# Patient Record
Sex: MALE | Race: BLACK OR AFRICAN AMERICAN | NOT HISPANIC OR LATINO | URBAN - METROPOLITAN AREA
[De-identification: names, ages, dates, MRNs, and addresses within clinical notes are randomized per-mention and may not be internally consistent; named-entity substitution may affect disease eponyms.]

---

## 2017-09-06 ENCOUNTER — EMERGENCY (EMERGENCY)
Facility: HOSPITAL | Age: 32
LOS: 1 days | Discharge: HOME | End: 2017-09-06
Admitting: EMERGENCY MEDICINE
Payer: MEDICARE

## 2017-09-06 VITALS
HEIGHT: 77 IN | RESPIRATION RATE: 17 BRPM | DIASTOLIC BLOOD PRESSURE: 80 MMHG | OXYGEN SATURATION: 99 % | SYSTOLIC BLOOD PRESSURE: 120 MMHG | TEMPERATURE: 98 F | WEIGHT: 210.1 LBS | HEART RATE: 97 BPM

## 2017-09-06 VITALS
TEMPERATURE: 98 F | SYSTOLIC BLOOD PRESSURE: 126 MMHG | RESPIRATION RATE: 18 BRPM | DIASTOLIC BLOOD PRESSURE: 78 MMHG | OXYGEN SATURATION: 99 % | HEART RATE: 67 BPM
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
## 2017-09-06 DIAGNOSIS — M54.2 CERVICALGIA: ICD-10-CM
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
## 2017-09-06 DIAGNOSIS — R51 HEADACHE: ICD-10-CM

## 2017-09-06 PROCEDURE — 70450 CT HEAD/BRAIN W/O DYE: CPT | Mod: 26

## 2017-09-06 PROCEDURE — 99283 EMERGENCY DEPT VISIT LOW MDM: CPT | Mod: 25

## 2017-09-06 PROCEDURE — 72125 CT NECK SPINE W/O DYE: CPT | Mod: 26

## 2017-09-06 RX ORDER — KETOROLAC TROMETHAMINE 30 MG/ML
30 SYRINGE (ML) INJECTION ONCE
Qty: 0 | Refills: 0 | Status: DISCONTINUED | OUTPATIENT
Start: 2017-09-06 | End: 2017-09-06

## 2017-09-06 RX ORDER — CLOPIDOGREL BISULFATE 75 MG/1
300 TABLET, FILM COATED ORAL ONCE
Qty: 0 | Refills: 0 | Status: DISCONTINUED | OUTPATIENT
Start: 2017-09-06 | End: 2017-09-06

## 2017-09-06 RX ADMIN — Medication 30 MILLIGRAM(S): at 07:17
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
## 2017-09-06 NOTE — ED PROVIDER NOTE - NS ED ROS FT
Reports headache and neck muscle pain.  Denies fevers, chills, nausea, vomiting, abdominal pain, chest pain, palpitations, shortness of breath, dyspnea on exertion, syncope/near syncope, cough/URI symptoms, weakness, numbness, focal deficits, visual changes, dizziness.
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
## 2017-09-06 NOTE — ED ADULT NURSE REASSESSMENT NOTE - NS ED NURSE REASSESS COMMENT FT1
pt received asleep but arousable to touch/voice. pt awaiting sobriety for discharge. continues on bedside cardiac monitoring.

## 2017-09-06 NOTE — ED ADULT NURSE NOTE - OBJECTIVE STATEMENT
pt. aaox3, here due to neck pain radiating to the back. pt. able to ambulate with steady gait. states pain started at 5 AM.

## 2018-10-01 ENCOUNTER — EMERGENCY (EMERGENCY)
Facility: HOSPITAL | Age: 33
LOS: 1 days | Discharge: HOME | End: 2018-10-01
Attending: EMERGENCY MEDICINE | Admitting: EMERGENCY MEDICINE
Payer: MEDICARE

## 2018-10-01 VITALS
HEART RATE: 88 BPM | OXYGEN SATURATION: 98 % | SYSTOLIC BLOOD PRESSURE: 141 MMHG | DIASTOLIC BLOOD PRESSURE: 87 MMHG | TEMPERATURE: 98 F | RESPIRATION RATE: 18 BRPM

## 2018-10-01 VITALS
RESPIRATION RATE: 18 BRPM | SYSTOLIC BLOOD PRESSURE: 138 MMHG | HEART RATE: 74 BPM | OXYGEN SATURATION: 99 % | DIASTOLIC BLOOD PRESSURE: 85 MMHG | TEMPERATURE: 98 F

## 2018-10-01 DIAGNOSIS — N34.2 OTHER URETHRITIS: ICD-10-CM

## 2018-10-01 DIAGNOSIS — Z20.2 CONTACT WITH AND (SUSPECTED) EXPOSURE TO INFECTIONS WITH A PREDOMINANTLY SEXUAL MODE OF TRANSMISSION: ICD-10-CM

## 2018-10-01 DIAGNOSIS — R30.0 DYSURIA: ICD-10-CM

## 2018-10-01 LAB — HIV 1 & 2 AB SERPL IA.RAPID: SIGNIFICANT CHANGE UP

## 2018-10-01 PROCEDURE — 99284 EMERGENCY DEPT VISIT MOD MDM: CPT

## 2018-10-01 RX ORDER — AZITHROMYCIN 500 MG/1
1000 TABLET, FILM COATED ORAL ONCE
Qty: 0 | Refills: 0 | Status: COMPLETED | OUTPATIENT
Start: 2018-10-01 | End: 2018-10-01

## 2018-10-01 RX ORDER — CEFTRIAXONE 500 MG/1
250 INJECTION, POWDER, FOR SOLUTION INTRAMUSCULAR; INTRAVENOUS ONCE
Qty: 0 | Refills: 0 | Status: COMPLETED | OUTPATIENT
Start: 2018-10-01 | End: 2018-10-01

## 2018-10-01 RX ADMIN — AZITHROMYCIN 1000 MILLIGRAM(S): 500 TABLET, FILM COATED ORAL at 22:42

## 2018-10-01 RX ADMIN — CEFTRIAXONE 250 MILLIGRAM(S): 500 INJECTION, POWDER, FOR SOLUTION INTRAMUSCULAR; INTRAVENOUS at 22:42

## 2018-10-01 NOTE — ED ADULT NURSE NOTE - NSIMPLEMENTINTERV_GEN_ALL_ED
Implemented All Universal Safety Interventions:  [place] to call system. Call bell, personal items and telephone within reach. Instruct patient to call for assistance. Room bathroom lighting operational. Non-slip footwear when patient is off stretcher. Physically safe environment: no spills, clutter or unnecessary equipment. Stretcher in lowest position, wheels locked, appropriate side rails in place.

## 2018-10-01 NOTE — ED PROVIDER NOTE - MEDICAL DECISION MAKING DETAILS
STI testing sent.  Treated with Ceftriaxone and Azithromycin.  Will have him have his partner be re-checked/treated.  Discussed f/u instructions.

## 2018-10-01 NOTE — ED ADULT NURSE NOTE - CHPI ED NUR SYMPTOMS NEG
no tingling/no nausea/no chills/no pain/no vomiting/no decreased eating/drinking/no weakness/no dizziness/no fever

## 2018-10-01 NOTE — ED PROVIDER NOTE - NSFOLLOWUPINSTRUCTIONS_ED_ALL_ED_FT
If any of your tests come back positive we will call you with the results.  If you want to inquire about your results please call [phone].     Please abstain from intercourse for 1 week while the antibiotics work.  Please have any sexual partners tested/treated for STDs.

## 2018-10-01 NOTE — ED ADULT NURSE NOTE - OBJECTIVE STATEMENT
Pt is a 33y male complaining of std/sti check. Pt states that his partner told him in April that she had an std. Pt states that he was never checked. Pt states "I think it burns when I pee, im not sure I have just been thinking about it a lot lately." Pt unsure if he has abnormal discharge states "I don't pay attention to it that much." Pt denies fever, chills, nausea, vomiting. Will continue to monitor.

## 2018-10-01 NOTE — ED PROVIDER NOTE - OBJECTIVE STATEMENT
Pt is a 34yo M with no PMH who presents requesting STI testing.  Pt reports sexually active with female partner that may have been exposed to chlamydia in the recent past.  Pt admits he waited to get f/u but know thinks he has had a very mild dysuria over the past few months.  Denies any discharge, blisters, ulcers, rash, itching, or any other concerns.  Denies any hx of any STI in past, including syphilis.     Contact number: [phone]

## 2018-10-01 NOTE — ED ADULT NURSE NOTE - EXTENSIONS OF SELF_ADULT
[FreeTextEntry1] : weight loss discussed\par follow up 4-6 months for med review\par will follow with gyn, (patient has period today)\par cont lexapro None

## 2018-10-02 LAB
C TRACH RRNA SPEC QL NAA+PROBE: SIGNIFICANT CHANGE UP
N GONORRHOEA RRNA SPEC QL NAA+PROBE: SIGNIFICANT CHANGE UP
SPECIMEN SOURCE: SIGNIFICANT CHANGE UP
T PALLIDUM AB TITR SER: NEGATIVE — SIGNIFICANT CHANGE UP

## 2018-11-20 ENCOUNTER — EMERGENCY (EMERGENCY)
Facility: HOSPITAL | Age: 33
LOS: 1 days | Discharge: HOME | End: 2018-11-20
Admitting: EMERGENCY MEDICINE
Payer: MEDICARE

## 2018-11-20 VITALS
OXYGEN SATURATION: 99 % | HEART RATE: 74 BPM | SYSTOLIC BLOOD PRESSURE: 128 MMHG | DIASTOLIC BLOOD PRESSURE: 80 MMHG | RESPIRATION RATE: 18 BRPM

## 2018-11-20 VITALS
HEART RATE: 72 BPM | SYSTOLIC BLOOD PRESSURE: 146 MMHG | RESPIRATION RATE: 16 BRPM | OXYGEN SATURATION: 99 % | DIASTOLIC BLOOD PRESSURE: 68 MMHG | TEMPERATURE: 98 F

## 2018-11-20 PROCEDURE — 72125 CT NECK SPINE W/O DYE: CPT | Mod: 26

## 2018-11-20 PROCEDURE — 72131 CT LUMBAR SPINE W/O DYE: CPT | Mod: 26

## 2018-11-20 PROCEDURE — 70450 CT HEAD/BRAIN W/O DYE: CPT | Mod: 26

## 2018-11-20 PROCEDURE — 99284 EMERGENCY DEPT VISIT MOD MDM: CPT

## 2018-11-20 RX ORDER — IBUPROFEN 200 MG
1 TABLET ORAL
Qty: 28 | Refills: 0 | OUTPATIENT
Start: 2018-11-20 | End: 2018-11-26

## 2018-11-20 RX ORDER — CYCLOBENZAPRINE HYDROCHLORIDE 10 MG/1
1 TABLET, FILM COATED ORAL
Qty: 10 | Refills: 0 | OUTPATIENT
Start: 2018-11-20 | End: 2018-11-24

## 2018-11-20 RX ORDER — CYCLOBENZAPRINE HYDROCHLORIDE 10 MG/1
10 TABLET, FILM COATED ORAL ONCE
Qty: 0 | Refills: 0 | Status: COMPLETED | OUTPATIENT
Start: 2018-11-20 | End: 2018-11-20

## 2018-11-20 RX ORDER — KETOROLAC TROMETHAMINE 30 MG/ML
60 SYRINGE (ML) INJECTION ONCE
Qty: 0 | Refills: 0 | Status: DISCONTINUED | OUTPATIENT
Start: 2018-11-20 | End: 2018-11-20

## 2018-11-20 RX ADMIN — CYCLOBENZAPRINE HYDROCHLORIDE 10 MILLIGRAM(S): 10 TABLET, FILM COATED ORAL at 14:43

## 2018-11-20 RX ADMIN — Medication 60 MILLIGRAM(S): at 14:43

## 2018-11-20 NOTE — ED ADULT NURSE NOTE - CHPI ED NUR SYMPTOMS NEG
no fatigue/no motor function loss/no difficulty bearing weight/no bladder dysfunction/no neck tenderness/no tingling/no numbness/no constipation/no bowel dysfunction/no anorexia

## 2018-11-20 NOTE — ED PROVIDER NOTE - NSFOLLOWUPINSTRUCTIONS_ED_ALL_ED_FT
Take Motrin and Flexeril as prescribed  Rest. No heavy lifting    RETURN TO THE EMERGENCY DEPARTMENT IF YOU HAVE ANY WORSENING OR NEW SYMPTOMS SUCH AS WORSENING PAIN, NUMBNESS, WEAKNESS, URINARY OR BOWEL INCONTINENCE.

## 2018-11-20 NOTE — ED ADULT NURSE NOTE - NSIMPLEMENTINTERV_GEN_ALL_ED
Implemented All Fall Risk Interventions:  [place] to call system. Call bell, personal items and telephone within reach. Instruct patient to call for assistance. Room bathroom lighting operational. Non-slip footwear when patient is off stretcher. Physically safe environment: no spills, clutter or unnecessary equipment. Stretcher in lowest position, wheels locked, appropriate side rails in place. Provide visual cue, wrist band, yellow gown, etc. Monitor gait and stability. Monitor for mental status changes and reorient to person, place, and time. Review medications for side effects contributing to fall risk. Reinforce activity limits and safety measures with patient and family.

## 2018-11-20 NOTE — ED PROVIDER NOTE - OBJECTIVE STATEMENT
32 y/o M with a PMHx of chronic neck and back pain secondary to herniated discs, presents to the ED c/o lower back pain with neck pain after fall 2 days ago. First fall was while he was trying to get into shower and he tripped over the bath tub. Pt had a second trip and fall while at home. States that he has not been following up with pain management and has lost follow up with doctor. Pt has not been taking anything for pain. Denies fevers, chills, numbness, tingling, urinary incontinence or bowel dysfunction. 34 y/o M with a PMHx of chronic neck and back pain, history of herniated discs, presents to the ED c/o lower back pain with neck pain after fall 2 days ago.  uses cane at baseline. First fall was while he was trying to get into shower and he tripped over the bath tub, then states he had another trip and fall while at home yesterday. States that he has not been following up with pain management and has lost follow up with his doctors. Pt has not been taking anything for pain. Denies numbness, tingling, weakness urinary incontinence or bowel dysfunction. States this is like his usual pain

## 2018-11-20 NOTE — ED PROVIDER NOTE - CARE PROVIDER_API CALL
Medardo Mishra), Pain Medicine; PhysicalRehab Medicine  [address] [facility] [address] [facility] [address]  [place] [address]  Phone: [phone]  Fax: [phone]

## 2018-11-20 NOTE — ED ADULT NURSE NOTE - NSFALLRSKINDICATORS_ED_ALL_ED
Pt presents with Mom for for a follow up of Asthma. He had a sinus infection which caused some breathing issues, but other than that he has been doing well.  Denies known Latex allergy or symptoms of Latex sensitivity.  Pharmacy verified.  Flu shot verified.  PCP verified.  Medications and Drug Allergies reviewed and updated.  Current Outpatient Prescriptions   Medication Sig Dispense Refill   • Peak Flow Meter Device Use as directed 1 Device 1   • cetirizine (ZYRTEC CHILDRENS ALLERGY) 10 MG chewable tablet Chew 10 mg by mouth daily.     • montelukast (SINGULAIR) 5 MG chewable tablet 1 tablet by mouth daily. 30 tablet 11   • fluticasone-salmeterol (ADVAIR HFA) 115-21 MCG/ACT inhaler Inhale 2 puffs into the lungs 2 times daily. 12 g 11   • albuterol (VENTOLIN HFA) 108 (90 Base) MCG/ACT inhaler Inhale 2 puffs into the lungs every 4 hours as needed for Shortness of Breath or Wheezing. 2 Inhaler 3   • albuterol (VENTOLIN) (2.5 MG/3ML) 0.083% nebulizer solution I vial every 4 hours if needed for cough, wheezing, or shortness of breath. 75 mL 3   • triamcinolone (NASACORT AQ) 55 MCG/ACT nasal inhaler 2 sprays in each nostril daily. 1 Inhaler 11     No current facility-administered medications for this visit.           no

## 2018-11-20 NOTE — ED PROVIDER NOTE - MUSCULOSKELETAL, MLM
Spine appears normal, range of motion is not limited, no muscle or joint tenderness Spine appears normal, no midline tenderness. moving all extremities x 4. strength equal throughout. no sensory deficits. normal gait with cane at baseline.

## 2018-11-20 NOTE — ED ADULT NURSE NOTE - OBJECTIVE STATEMENT
pt is a 34 y/o male presents to the ED for increase pain to back. h/o chronic back pain. pt uses a cart as a walker.

## 2018-11-20 NOTE — ED PROVIDER NOTE - MEDICAL DECISION MAKING DETAILS
chronic back and neck pain, advised f/u pain management, rx nsaids/flexeril, return precautions discussed.
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
## 2018-11-24 DIAGNOSIS — M54.2 CERVICALGIA: ICD-10-CM

## 2018-11-24 DIAGNOSIS — G89.29 OTHER CHRONIC PAIN: ICD-10-CM

## 2018-11-24 DIAGNOSIS — M54.5 LOW BACK PAIN: ICD-10-CM

## 2021-01-22 ENCOUNTER — EMERGENCY (EMERGENCY)
Facility: HOSPITAL | Age: 36
LOS: 1 days | Discharge: HOME | End: 2021-01-22
Attending: EMERGENCY MEDICINE | Admitting: EMERGENCY MEDICINE
Payer: MEDICAID

## 2021-01-22 ENCOUNTER — EMERGENCY (EMERGENCY)
Facility: HOSPITAL | Age: 36
LOS: 1 days | Discharge: HOME | End: 2021-01-22
Attending: EMERGENCY MEDICINE | Admitting: EMERGENCY MEDICINE

## 2021-01-22 VITALS
RESPIRATION RATE: 16 BRPM | OXYGEN SATURATION: 97 % | HEIGHT: 77 IN | HEART RATE: 93 BPM | WEIGHT: 205.03 LBS | TEMPERATURE: 98 F | DIASTOLIC BLOOD PRESSURE: 95 MMHG | SYSTOLIC BLOOD PRESSURE: 148 MMHG

## 2021-01-22 VITALS — HEIGHT: 78 IN | WEIGHT: 205.03 LBS

## 2021-01-22 DIAGNOSIS — L73.1 PSEUDOFOLLICULITIS BARBAE: ICD-10-CM

## 2021-01-22 PROCEDURE — 99283 EMERGENCY DEPT VISIT LOW MDM: CPT

## 2021-01-22 RX ORDER — CEPHALEXIN 500 MG
1 CAPSULE ORAL
Qty: 14 | Refills: 0
Start: 2021-01-22 | End: 2021-01-28

## 2021-01-22 NOTE — ED PROVIDER NOTE - ENMT, MLM
Airway patent, Nasal mucosa clear. Mouth with normal mucosa. Throat has no vesicles, no oropharyngeal exudates and uvula is midline. no sublingual or submental edema, no palpable crepitus. oropharynx clear. no thyromegaly.

## 2021-01-22 NOTE — ED PROVIDER NOTE - SKIN WOUND TYPE
palpable fibrotic skin to submental region inferior to chin, with no palpable crepitus, no lesions, overlying beard, with no signs of fluctuance, or induration, nontender, no signs of abscess.

## 2021-01-22 NOTE — ED PROVIDER NOTE - OBJECTIVE STATEMENT
34 yo M, prior hx of chronic cervical neck pain. presents with anterior submental folliculitis that is intermittent for 4 months. patient attempting to seek dermatology appt, but unable to secure one at [facility]. denies redness or drainage today. notes he shaves in that area. denies fever, chills or swelling at this time. denies pain. denies recent dental work, denies neck stiffness or pain. notes no other lesions. denies facial or oral swelling.

## 2021-01-22 NOTE — ED PROVIDER NOTE - NSFOLLOWUPINSTRUCTIONS_ED_ALL_ED_FT
apply warm compress to the area if it becomes reddened or painful    Start antibiotics if region becomes red or swollen.    follow up with dermatology.

## 2021-01-22 NOTE — ED PROVIDER NOTE - PATIENT PORTAL LINK FT
You can access the FollowMyHealth Patient Portal offered by [facility] by registering at the following website: [email or web address]. By joining [facility]’s FollowMyHealth portal, you will also be able to view your health information using other applications (apps) compatible with our system.

## 2021-01-22 NOTE — ED PROVIDER NOTE - CLINICAL SUMMARY MEDICAL DECISION MAKING FREE TEXT BOX
given derm referral as issue is chronic, no signs of infection now, but sent keflex to pharm should it worsen. appears well, has no signs of abscess or ray.

## 2021-01-23 PROBLEM — M54.9 DORSALGIA, UNSPECIFIED: Chronic | Status: ACTIVE | Noted: 2018-11-20

## 2021-01-23 PROBLEM — M54.2 CERVICALGIA: Chronic | Status: ACTIVE | Noted: 2018-11-20

## 2024-03-11 NOTE — ED ADULT NURSE NOTE - CHIEF COMPLAINT
IV in hand infiltrated, puffy. Removed, warm compress to hand.   The patient is a 35y Male complaining of neck injury.